# Patient Record
Sex: MALE | Race: BLACK OR AFRICAN AMERICAN | NOT HISPANIC OR LATINO
[De-identification: names, ages, dates, MRNs, and addresses within clinical notes are randomized per-mention and may not be internally consistent; named-entity substitution may affect disease eponyms.]

---

## 2019-12-26 ENCOUNTER — APPOINTMENT (OUTPATIENT)
Dept: UROLOGY | Facility: CLINIC | Age: 34
End: 2019-12-26
Payer: MEDICAID

## 2019-12-26 VITALS
HEIGHT: 69 IN | WEIGHT: 198 LBS | DIASTOLIC BLOOD PRESSURE: 60 MMHG | SYSTOLIC BLOOD PRESSURE: 94 MMHG | HEART RATE: 77 BPM | BODY MASS INDEX: 29.33 KG/M2

## 2019-12-26 DIAGNOSIS — N48.1 BALANITIS: ICD-10-CM

## 2019-12-26 DIAGNOSIS — Z84.1 FAMILY HISTORY OF DISORDERS OF KIDNEY AND URETER: ICD-10-CM

## 2019-12-26 DIAGNOSIS — Z80.42 FAMILY HISTORY OF MALIGNANT NEOPLASM OF PROSTATE: ICD-10-CM

## 2019-12-26 DIAGNOSIS — Z78.9 OTHER SPECIFIED HEALTH STATUS: ICD-10-CM

## 2019-12-26 PROCEDURE — 99204 OFFICE O/P NEW MOD 45 MIN: CPT

## 2019-12-26 RX ORDER — CLOTRIMAZOLE AND BETAMETHASONE DIPROPIONATE 10; .5 MG/G; MG/G
1-0.05 CREAM TOPICAL TWICE DAILY
Qty: 1 | Refills: 1 | Status: ACTIVE | COMMUNITY
Start: 2019-12-26 | End: 1900-01-01

## 2019-12-26 RX ORDER — WARFARIN SODIUM 6 MG/1
TABLET ORAL
Refills: 0 | Status: ACTIVE | COMMUNITY

## 2019-12-26 RX ORDER — FLUTICASONE PROPIONATE 50 UG/1
50 SPRAY, METERED NASAL
Qty: 16 | Refills: 0 | Status: ACTIVE | COMMUNITY
Start: 2019-12-23

## 2019-12-26 NOTE — REVIEW OF SYSTEMS
[Recent Weight Loss (___ Lbs)] : recent [unfilled] ~Ulb weight loss [Nasal Discharge] : nasal discharge [Chest Pain] : chest pain [Heartburn] : heartburn [see HPI] : see HPI [Painful [place]] : painful [place] [Wake up at night to urinate  How many times?  ___] : wakes up to urinate [unfilled] times during the night [Joint Pain] : joint pain [Skin Lesions] : skin lesion [Itching] : itching [Negative] : Heme/Lymph

## 2019-12-26 NOTE — HISTORY OF PRESENT ILLNESS
[FreeTextEntry1] : cc rash and bump on penis \par 33 yo male c/o rash and bump on penis \par has been present for weeks\par no dysuria \par sexually active one long ter partner\par no h/o std  \par mildly itchy \par has rash on upper extremity and roin as well \par no relevant fam hx

## 2021-08-30 ENCOUNTER — APPOINTMENT (OUTPATIENT)
Dept: SURGERY | Facility: CLINIC | Age: 36
End: 2021-08-30
Payer: MEDICAID

## 2021-09-09 ENCOUNTER — APPOINTMENT (OUTPATIENT)
Dept: SURGERY | Facility: CLINIC | Age: 36
End: 2021-09-09
Payer: MEDICAID

## 2021-09-13 ENCOUNTER — APPOINTMENT (OUTPATIENT)
Dept: SURGERY | Facility: CLINIC | Age: 36
End: 2021-09-13
Payer: MEDICAID

## 2021-09-13 VITALS
HEIGHT: 69 IN | WEIGHT: 198 LBS | HEART RATE: 97 BPM | DIASTOLIC BLOOD PRESSURE: 70 MMHG | TEMPERATURE: 97.1 F | SYSTOLIC BLOOD PRESSURE: 105 MMHG | BODY MASS INDEX: 29.33 KG/M2

## 2021-09-13 DIAGNOSIS — M79.89 OTHER SPECIFIED SOFT TISSUE DISORDERS: ICD-10-CM

## 2021-09-13 DIAGNOSIS — R59.9 ENLARGED LYMPH NODES, UNSPECIFIED: ICD-10-CM

## 2021-09-13 DIAGNOSIS — Z56.0 UNEMPLOYMENT, UNSPECIFIED: ICD-10-CM

## 2021-09-13 PROCEDURE — 99203 OFFICE O/P NEW LOW 30 MIN: CPT

## 2021-09-13 SDOH — ECONOMIC STABILITY - INCOME SECURITY: UNEMPLOYMENT, UNSPECIFIED: Z56.0

## 2021-09-13 NOTE — PHYSICAL EXAM
[Alert] : alert [Oriented to Person] : oriented to person [Oriented to Place] : oriented to place [Oriented to Time] : oriented to time [Calm] : calm [de-identified] : The patient is alert, well-groomed, well developed and cheerful.  [de-identified] : Breath sounds equal and bilateral, no wheezing no rales or rhonchi  [de-identified] :  good S1, S2, no m/r/g bilateral  [de-identified] : WNL [de-identified] : 2 cm left scalp mass Mass is -mobile, Smooth, non-tender, Well defined. Superficial. No palpable lymph nodes. He wishes for it to be removed

## 2021-09-13 NOTE — HISTORY OF PRESENT ILLNESS
[de-identified] : ANA DELUCA is a 36 year old male who present in the office for initial consultation who was referred by Dr. GAMBLE with the chief complaint of having right neck lymph node enlargement. Patient stated that he has some dental problem on the left side. Patient denies any discomfort, no fever. In addition he has 2 cm left scalp mass Mass is -mobile, Smooth, non-tender, Well defined. Superficial. No palpable lymph nodes. He wishes for it to be removed \par

## 2021-09-13 NOTE — DATA REVIEWED
[FreeTextEntry1] : Date of Exam: 07-\par  \par EXAM:  ULTRASOUND SOFT TISSUE NECK\par \par HISTORY:  Neck discomfort.  \par \par TECHNIQUE:  Real time sonographic imaging of the neck is performed. High frequency linear array transducer is utilized to obtain grayscale and color Doppler images. Static images are provided for review. \par \par COMPARISON:  None. \par \par FINDINGS: Right level 2 region lymph node likely with fatty hilum measures 1.8 x 0.8 x 1.6 cm. This may represent a reactive lymph node as a fatty hilum is not well imaged. A more normal looking lymph node left level 2 measures 1.9 x 0.7 x 1.1 cm.\par

## 2021-09-13 NOTE — PLAN
[FreeTextEntry1] : - Results of her recent imaging and physical examination findings were discussed in detail. He was informed of significance of findings. All of the options, risks and benefits were explained. \par -  We recommend Excision of left scalp mass he agrees to proceed and wants to come back for surgery in the office

## 2021-09-13 NOTE — ASSESSMENT
[FreeTextEntry1] : ANA DELUCA is a 36 year old male with left scalp mass and enlarge lymph node. \par \par - No lymph node biopsy is recommended at present time\par \par - Excision of left scalp mass.  Patient will schedule a procedure  \par \par

## 2021-09-20 ENCOUNTER — APPOINTMENT (OUTPATIENT)
Dept: SURGERY | Facility: CLINIC | Age: 36
End: 2021-09-20
Payer: MEDICAID

## 2021-09-20 DIAGNOSIS — L72.9 FOLLICULAR CYST OF THE SKIN AND SUBCUTANEOUS TISSUE, UNSPECIFIED: ICD-10-CM

## 2021-09-20 PROCEDURE — 21012 EXC FACE LES SBQ 2 CM/>: CPT

## 2021-09-20 NOTE — CONSULT LETTER
[Dear  ___] : Dear  [unfilled], [Courtesy Letter:] : I had the pleasure of seeing your patient, [unfilled], in my office today. [Sincerely,] : Sincerely, [FreeTextEntry3] : Dr Denise

## 2021-09-20 NOTE — PLAN
[FreeTextEntry1] : Procedure\par \par Preoperative diagnosis : scalp cyst near vertex 2 cm\par Post operative diagnosis : same\par Procedure : excision scalp cyst\par \par The area was cleaned and prepped. Local anaesthesia 1% lidocaine with epinephrine was instilled in the skin and surrounding tissue The skin was incised with the scalpel and the mass was dissected out completely and excised. The wound was closed in layers. Tolerated the procedure. Post op instructions given\par Specimen sent for pathology\par \par

## 2021-09-20 NOTE — HISTORY OF PRESENT ILLNESS
[de-identified] : ANA DELUCA is a 36 year old male who presents in the office for in office procedure for Excision of left scalp mass

## 2021-09-23 LAB — CORE LAB BIOPSY: NORMAL

## 2021-09-27 ENCOUNTER — APPOINTMENT (OUTPATIENT)
Dept: SURGERY | Facility: CLINIC | Age: 36
End: 2021-09-27
Payer: MEDICAID

## 2021-09-27 VITALS — TEMPERATURE: 96.8 F

## 2021-09-27 PROCEDURE — 99024 POSTOP FOLLOW-UP VISIT: CPT

## 2021-09-27 NOTE — CONSULT LETTER
[Dear  ___] : Dear  [unfilled], [Courtesy Letter:] : I had the pleasure of seeing your patient, [unfilled], in my office today. [Consult Closing:] : Thank you very much for allowing me to participate in the care of this patient.  If you have any questions, please do not hesitate to contact me. [Sincerely,] : Sincerely, [FreeTextEntry3] : Dr Denise

## 2021-09-27 NOTE — HISTORY OF PRESENT ILLNESS
[de-identified] : ANA DELUCA is a 36 year old male who presents in the office for post procedure visit. He underwent an excision scalp cyst. 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 \par \par

## 2021-09-27 NOTE — ASSESSMENT
[FreeTextEntry1] : ANA DELUCA is a 36 year old male who underwent a excision scalp cyst. 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 \par \par

## 2022-09-22 ENCOUNTER — TRANSCRIPTION ENCOUNTER (OUTPATIENT)
Age: 37
End: 2022-09-22

## 2025-05-07 ENCOUNTER — NON-APPOINTMENT (OUTPATIENT)
Age: 40
End: 2025-05-07

## 2025-05-08 ENCOUNTER — APPOINTMENT (OUTPATIENT)
Dept: PLASTIC SURGERY | Facility: CLINIC | Age: 40
End: 2025-05-08
Payer: COMMERCIAL

## 2025-05-08 VITALS — WEIGHT: 195 LBS | BODY MASS INDEX: 28.88 KG/M2 | HEIGHT: 69 IN

## 2025-05-08 DIAGNOSIS — L72.0 EPIDERMAL CYST: ICD-10-CM

## 2025-05-08 PROCEDURE — 10060 I&D ABSCESS SIMPLE/SINGLE: CPT
